# Patient Record
Sex: MALE | Race: BLACK OR AFRICAN AMERICAN | NOT HISPANIC OR LATINO | Employment: UNEMPLOYED | URBAN - METROPOLITAN AREA
[De-identification: names, ages, dates, MRNs, and addresses within clinical notes are randomized per-mention and may not be internally consistent; named-entity substitution may affect disease eponyms.]

---

## 2024-09-03 ENCOUNTER — HOSPITAL ENCOUNTER (EMERGENCY)
Facility: CLINIC | Age: 39
Discharge: HOME | End: 2024-09-03
Attending: EMERGENCY MEDICINE | Admitting: EMERGENCY MEDICINE

## 2024-09-03 ENCOUNTER — APPOINTMENT (OUTPATIENT)
Dept: RADIOLOGY | Facility: CLINIC | Age: 39
End: 2024-09-03
Attending: EMERGENCY MEDICINE

## 2024-09-03 VITALS
OXYGEN SATURATION: 99 % | RESPIRATION RATE: 16 BRPM | HEART RATE: 60 BPM | WEIGHT: 145 LBS | SYSTOLIC BLOOD PRESSURE: 138 MMHG | DIASTOLIC BLOOD PRESSURE: 84 MMHG | TEMPERATURE: 97 F

## 2024-09-03 DIAGNOSIS — M25.562 ACUTE PAIN OF LEFT KNEE: ICD-10-CM

## 2024-09-03 PROCEDURE — 250N000013 HC RX MED GY IP 250 OP 250 PS 637: Performed by: EMERGENCY MEDICINE

## 2024-09-03 PROCEDURE — 73562 X-RAY EXAM OF KNEE 3: CPT | Mod: 50

## 2024-09-03 PROCEDURE — 99284 EMERGENCY DEPT VISIT MOD MDM: CPT

## 2024-09-03 RX ORDER — ACETAMINOPHEN 325 MG/1
975 TABLET ORAL ONCE
Status: COMPLETED | OUTPATIENT
Start: 2024-09-03 | End: 2024-09-03

## 2024-09-03 RX ORDER — IBUPROFEN 600 MG/1
600 TABLET, FILM COATED ORAL ONCE
Status: COMPLETED | OUTPATIENT
Start: 2024-09-03 | End: 2024-09-03

## 2024-09-03 RX ORDER — ACETAMINOPHEN 500 MG
1000 TABLET ORAL 3 TIMES DAILY
Qty: 30 TABLET | Refills: 0 | Status: SHIPPED | OUTPATIENT
Start: 2024-09-03

## 2024-09-03 RX ORDER — IBUPROFEN 600 MG/1
600 TABLET, FILM COATED ORAL EVERY 6 HOURS PRN
Qty: 30 TABLET | Refills: 0 | Status: SHIPPED | OUTPATIENT
Start: 2024-09-03

## 2024-09-03 RX ADMIN — IBUPROFEN 600 MG: 600 TABLET ORAL at 22:48

## 2024-09-03 RX ADMIN — ACETAMINOPHEN 975 MG: 325 TABLET ORAL at 22:48

## 2024-09-03 ASSESSMENT — ACTIVITIES OF DAILY LIVING (ADL)
ADLS_ACUITY_SCORE: 33
ADLS_ACUITY_SCORE: 35

## 2024-09-03 ASSESSMENT — COLUMBIA-SUICIDE SEVERITY RATING SCALE - C-SSRS
6. HAVE YOU EVER DONE ANYTHING, STARTED TO DO ANYTHING, OR PREPARED TO DO ANYTHING TO END YOUR LIFE?: NO
2. HAVE YOU ACTUALLY HAD ANY THOUGHTS OF KILLING YOURSELF IN THE PAST MONTH?: NO
1. IN THE PAST MONTH, HAVE YOU WISHED YOU WERE DEAD OR WISHED YOU COULD GO TO SLEEP AND NOT WAKE UP?: NO

## 2024-09-04 NOTE — ED TRIAGE NOTES
Pt arrives to ED with c/o knee pain to both knees. Pt endorses to falling down the stairs at the end of August and landed on his left knee. He fell again today in the bathroom because the bathroom floor was wet. Now his right knee hurts. Denies hitting head. No LOC. Has been putting voltaran cream on both knees. Needs [facility] .      Triage Assessment (Adult)       Row Name 09/03/24 1922          Triage Assessment    Airway WDL WDL        Respiratory WDL    Respiratory WDL WDL        Skin Circulation/Temperature WDL    Skin Circulation/Temperature WDL WDL        Cardiac WDL    Cardiac WDL WDL        Peripheral/Neurovascular WDL    Peripheral Neurovascular WDL WDL        Cognitive/Neuro/Behavioral WDL    Cognitive/Neuro/Behavioral WDL WDL

## 2024-09-04 NOTE — ED PROVIDER NOTES
EMERGENCY DEPARTMENT ENCOUNTER      NAME: Enid Valdivia  AGE: 39 year old male  YOB: 1985  MRN: [id number]  EVALUATION DATE & TIME: 9/3/2024 10:00 PM    PCP: No Ref-Primary, Physician    ED PROVIDER: Brian Hernandez M.D.      Chief Complaint   Patient presents with    Knee Pain         FINAL IMPRESSION:  1. Acute pain of left knee          ED COURSE & MEDICAL DECISION MAKING:    Pertinent Labs & Imaging studies reviewed below.  All EKGs below represent my independent interpretation.   ED Course as of 09/03/24 2341   Tue Sep 03, 2024   2232 Patient is a 39-year-old Summer presents with acute left knee pain.  It was already sore after falling couple weeks ago, but it has become much worse after another fall today.  He has tenderness to the medial joint line.  No ligamentous instability, no anterior posterior laxity.  There is no joint effusion.  I suspect a strain or meniscus injury.  He is able to ambulate blood flow.  X-ray negative.  Plan to treat with NSAIDs, have him follow-up with [facility].   2233 XR Knee Left 3 Views  No acute fracture or effusion. Small ossicle along the cephalad margin of the medial intercondylar eminence. Tiny ossific fragment along the superolateral margin of the lateral tibial plateau.   2233 XR Knee Right 3 Views  Normal joint spaces and alignment. No fracture or joint effusion.       Additional ED Course Timestamps:    10:18 PM I met with the patient, obtained history, performed an initial exam, and discussed options and plan for diagnostics and treatment here in the ED. We discussed plans for discharge including supportive cares, symptomatic treatment, outpatient follow up, and reasons to return to the emergency department.    Medical Decision Making  Obtained supplemental history:Supplemental history obtained?: No  Reviewed external records: External records reviewed?: Documented in chart and Outpatient Record: 08/21/2024 [facility]  Visit  Care impacted by chronic illness:N/A  Care significantly affected by social determinants of health:N/A  Did you consider but not order tests?: Work up considered but not performed and documented in chart, if applicable  Did you interpret images independently?: Independent interpretation of ECG and images noted in documentation, when applicable.  Consultation discussion with other provider: Phone conversation with consultants will be documented in the ED Course  Discharge. No recommendations on prescription strength medication(s). See documentation for any additional details.    At the conclusion of the encounter I discussed the results of all of the tests and the disposition. The questions were answered. The patient or family acknowledged understanding and was agreeable with the care plan.     MEDICATIONS GIVEN IN THE EMERGENCY:  Medications   acetaminophen (TYLENOL) tablet 975 mg (975 mg Oral $Given 9/3/24 2248)   ibuprofen (ADVIL/MOTRIN) tablet 600 mg (600 mg Oral $Given 9/3/24 2248)         NEW PRESCRIPTIONS STARTED AT TODAY'S ER VISIT  Discharge Medication List as of 9/3/2024 10:53 PM        START taking these medications    Details   acetaminophen (TYLENOL) 500 MG tablet Take 2 tablets (1,000 mg) by mouth 3 times daily., Disp-30 tablet, R-0, Local Print      ibuprofen (ADVIL/MOTRIN) 600 MG tablet Take 1 tablet (600 mg) by mouth every 6 hours as needed for moderate pain., Disp-30 tablet, R-0, Local Print                =================================================================    HPI    Enid Valdivia is a 39 year old male who presents to this ED for evaluation of knee pain.     The patient fell down a flight of stairs on 08/08, landing on his left knee. Since then, he has been applying Voltaren cream although the pain and swelling have not improved. Today, 09/03, he slipped on the wet floor of his bathroom and hit his left knee. This exacerbated his left knee pain. The pain is mostly located in  the medial aspect of his knee although he has recently developed pain in the lateral aspect. Touching his knee and ambulating both worsen the pain. The patient has been wearing a brace on his left knee. He has not yet taken pain medications.     The patient otherwise has no known chronic medical problems or medication allergies.     Per Chart Review, the patient was seen on 08/21/2024 at [facility] for evaluation of left shin cellulitis and right knee pain. He was started on Debrox solution 6.5% for his left knee pain. Started on Lidocaine patch 5% for cellulitis.       VITALS:  /84   Pulse 60   Temp 97  F (36.1  C) (Temporal)   Resp 16   Wt 65.8 kg (145 lb)   SpO2 99%     PHYSICAL EXAM    Constitutional: Well developed, well nourished. Comfortable appearing.  HENT: Atraumatic, mucous membranes moist, nose normal. Neck- Supple, gross ROM intact.   Eyes: Pupils mid-range, conjunctiva without injection, no discharge.   Respiratory: Normal respiratory rate, breathing comfortably  Cardiovascular: Normal heart rate, regular rhythm, no murmurs.   Musculoskeletal: Moving all 4 extremities intentionally. No obvious deformity. Tenderness to medial left knee. No joint effusion or laxity.   Skin: Warm, dry, no rash.  Neurologic: Alert & oriented x 3, cranial nerves grossly intact.  Psychiatric: Affect normal, cooperative.      PROCEDURES:   None      I, Chacha Hair am serving as a scribe to document services personally performed by Dr. Brian Hernandez based on my observation and the provider's statements to me. IBrian MD attest that Chacha Hair is acting in a scribe capacity, has observed my performance of the services and has documented them in accordance with my direction.    Brian Hernandez M.D.  Emergency Medicine  [facility] EMERGENCY ROOM  [address]  [id number]  [phone]  Dept: [phone]       Brian Hernandez MD  09/03/24 2393

## 2024-11-01 ENCOUNTER — HOSPITAL ENCOUNTER (EMERGENCY)
Facility: CLINIC | Age: 39
End: 2024-11-01